# Patient Record
Sex: MALE | Race: WHITE | Employment: UNEMPLOYED | URBAN - METROPOLITAN AREA
[De-identification: names, ages, dates, MRNs, and addresses within clinical notes are randomized per-mention and may not be internally consistent; named-entity substitution may affect disease eponyms.]

---

## 2022-06-14 ENCOUNTER — HOSPITAL ENCOUNTER (INPATIENT)
Age: 22
LOS: 3 days | Discharge: HOME | DRG: 885 | End: 2022-06-17
Attending: EMERGENCY MEDICINE | Admitting: PSYCHIATRY & NEUROLOGY
Payer: COMMERCIAL

## 2022-06-14 DIAGNOSIS — F32.A DEPRESSION WITH SUICIDAL IDEATION: Primary | ICD-10-CM

## 2022-06-14 DIAGNOSIS — R45.851 DEPRESSION WITH SUICIDAL IDEATION: Primary | ICD-10-CM

## 2022-06-14 LAB
ABSOLUTE EOS #: 0 K/UL (ref 0–0.4)
ABSOLUTE LYMPH #: 1.4 K/UL (ref 1–4.8)
ABSOLUTE MONO #: 0.6 K/UL (ref 0.1–1.3)
ACETAMINOPHEN LEVEL: <5 UG/ML (ref 10–30)
ALBUMIN SERPL-MCNC: 4.7 G/DL (ref 3.5–5.2)
ALP BLD-CCNC: 86 U/L (ref 40–129)
ALT SERPL-CCNC: 11 U/L (ref 5–41)
ANION GAP SERPL CALCULATED.3IONS-SCNC: 9 MMOL/L (ref 9–17)
AST SERPL-CCNC: 12 U/L
BASOPHILS # BLD: 0 % (ref 0–2)
BASOPHILS ABSOLUTE: 0 K/UL (ref 0–0.2)
BILIRUB SERPL-MCNC: 0.29 MG/DL (ref 0.3–1.2)
BUN BLDV-MCNC: 8 MG/DL (ref 6–20)
CALCIUM SERPL-MCNC: 9.3 MG/DL (ref 8.6–10.4)
CHLORIDE BLD-SCNC: 106 MMOL/L (ref 98–107)
CO2: 26 MMOL/L (ref 20–31)
CREAT SERPL-MCNC: 0.85 MG/DL (ref 0.7–1.2)
EOSINOPHILS RELATIVE PERCENT: 0 % (ref 0–4)
ETHANOL PERCENT: <0.01 %
ETHANOL: <10 MG/DL
GFR AFRICAN AMERICAN: >60 ML/MIN
GFR NON-AFRICAN AMERICAN: >60 ML/MIN
GFR SERPL CREATININE-BSD FRML MDRD: ABNORMAL ML/MIN/{1.73_M2}
GLUCOSE BLD-MCNC: 94 MG/DL (ref 70–99)
HCT VFR BLD CALC: 42.8 % (ref 41–53)
HEMOGLOBIN: 14.3 G/DL (ref 13.5–17.5)
LYMPHOCYTES # BLD: 13 % (ref 25–45)
MCH RBC QN AUTO: 28.3 PG (ref 26–34)
MCHC RBC AUTO-ENTMCNC: 33.4 G/DL (ref 31–37)
MCV RBC AUTO: 84.6 FL (ref 80–100)
MONOCYTES # BLD: 6 % (ref 2–8)
PDW BLD-RTO: 14 % (ref 11.5–14.9)
PLATELET # BLD: 250 K/UL (ref 150–450)
PMV BLD AUTO: 9.3 FL (ref 6–12)
POTASSIUM SERPL-SCNC: 3.9 MMOL/L (ref 3.7–5.3)
RBC # BLD: 5.06 M/UL (ref 4.5–5.9)
SALICYLATE LEVEL: <1 MG/DL (ref 3–10)
SEG NEUTROPHILS: 81 % (ref 34–64)
SEGMENTED NEUTROPHILS ABSOLUTE COUNT: 8.7 K/UL (ref 1.3–9.1)
SODIUM BLD-SCNC: 141 MMOL/L (ref 135–144)
TOTAL PROTEIN: 7.1 G/DL (ref 6.4–8.3)
WBC # BLD: 10.8 K/UL (ref 4.5–13.5)

## 2022-06-14 PROCEDURE — 80053 COMPREHEN METABOLIC PANEL: CPT

## 2022-06-14 PROCEDURE — 80143 DRUG ASSAY ACETAMINOPHEN: CPT

## 2022-06-14 PROCEDURE — 1240000000 HC EMOTIONAL WELLNESS R&B

## 2022-06-14 PROCEDURE — 36415 COLL VENOUS BLD VENIPUNCTURE: CPT

## 2022-06-14 PROCEDURE — G0480 DRUG TEST DEF 1-7 CLASSES: HCPCS

## 2022-06-14 PROCEDURE — 80179 DRUG ASSAY SALICYLATE: CPT

## 2022-06-14 PROCEDURE — 99285 EMERGENCY DEPT VISIT HI MDM: CPT

## 2022-06-14 PROCEDURE — 85025 COMPLETE CBC W/AUTO DIFF WBC: CPT

## 2022-06-14 RX ORDER — TRAZODONE HYDROCHLORIDE 50 MG/1
50 TABLET ORAL NIGHTLY PRN
Status: DISCONTINUED | OUTPATIENT
Start: 2022-06-15 | End: 2022-06-17 | Stop reason: HOSPADM

## 2022-06-14 RX ORDER — HYDROXYZINE 50 MG/1
50 TABLET, FILM COATED ORAL 3 TIMES DAILY PRN
Status: DISCONTINUED | OUTPATIENT
Start: 2022-06-14 | End: 2022-06-17 | Stop reason: HOSPADM

## 2022-06-14 RX ORDER — POLYETHYLENE GLYCOL 3350 17 G/17G
17 POWDER, FOR SOLUTION ORAL DAILY PRN
Status: DISCONTINUED | OUTPATIENT
Start: 2022-06-14 | End: 2022-06-17 | Stop reason: HOSPADM

## 2022-06-14 RX ORDER — ACETAMINOPHEN 325 MG/1
650 TABLET ORAL EVERY 4 HOURS PRN
Status: DISCONTINUED | OUTPATIENT
Start: 2022-06-14 | End: 2022-06-17 | Stop reason: HOSPADM

## 2022-06-14 RX ORDER — IBUPROFEN 400 MG/1
400 TABLET ORAL EVERY 6 HOURS PRN
Status: DISCONTINUED | OUTPATIENT
Start: 2022-06-14 | End: 2022-06-17 | Stop reason: HOSPADM

## 2022-06-14 RX ORDER — MAGNESIUM HYDROXIDE/ALUMINUM HYDROXICE/SIMETHICONE 120; 1200; 1200 MG/30ML; MG/30ML; MG/30ML
30 SUSPENSION ORAL EVERY 6 HOURS PRN
Status: DISCONTINUED | OUTPATIENT
Start: 2022-06-14 | End: 2022-06-17 | Stop reason: HOSPADM

## 2022-06-14 ASSESSMENT — ENCOUNTER SYMPTOMS
ABDOMINAL PAIN: 0
COUGH: 0

## 2022-06-14 ASSESSMENT — PAIN - FUNCTIONAL ASSESSMENT: PAIN_FUNCTIONAL_ASSESSMENT: NONE - DENIES PAIN

## 2022-06-14 ASSESSMENT — PATIENT HEALTH QUESTIONNAIRE - PHQ9
SUM OF ALL RESPONSES TO PHQ QUESTIONS 1-9: 18
SUM OF ALL RESPONSES TO PHQ QUESTIONS 1-9: 18

## 2022-06-14 ASSESSMENT — LIFESTYLE VARIABLES
HOW MANY STANDARD DRINKS CONTAINING ALCOHOL DO YOU HAVE ON A TYPICAL DAY: 1 OR 2
HOW OFTEN DO YOU HAVE A DRINK CONTAINING ALCOHOL: NEVER
HOW OFTEN DO YOU HAVE A DRINK CONTAINING ALCOHOL: NEVER

## 2022-06-14 ASSESSMENT — SLEEP AND FATIGUE QUESTIONNAIRES
DO YOU USE A SLEEP AID: NO
AVERAGE NUMBER OF SLEEP HOURS: 6
DO YOU HAVE DIFFICULTY SLEEPING: NO

## 2022-06-14 NOTE — ED NOTES
This writer consulted with Dr Bassam Giraldo who recommended inpatient hospitalization for safety and stabilization. Patient signed application for voluntary admission to [facility].

## 2022-06-14 NOTE — ED PROVIDER NOTES
[address]  EMERGENCY DEPARTMENT ENCOUNTER      Pt Name: Santi Chacon  MRN: [id number]  Tamaragfteja 2000  Date of evaluation: 6/14/22      CHIEF COMPLAINT       Chief Complaint   Patient presents with    Psychiatric Evaluation     HISTORY OF PRESENT ILLNESS   HPI 25 y.o. male presents with c/o suicidal thoughts. The patient was brought to the emergency department by [employer]. Pt reports that he has been having suicidal thoughts for the last month  The patient has been thinking of shooting himself. The patient denies a h/o of previous suicide attempt. The patient does not know what has provoked his symptoms. He states that he has a lot of problems dealing with stress and anxiety. Reports that he was fired from his job recently because of his anger / behavior problems. He also reports that he is living at home with his parents. The patient denies a h/o of previous psychiatric admission. The patient denies drug use, denies attempts at self harm to this point. The patient denies medical complaints at this time. REVIEW OF SYSTEMS     Review of Systems   Constitutional: Negative for fever. HENT: Negative for congestion. Eyes: Negative for visual disturbance. Respiratory: Negative for cough. Cardiovascular: Negative for chest pain. Gastrointestinal: Negative for abdominal pain. Genitourinary: Negative for difficulty urinating. Musculoskeletal: Negative for arthralgias. Skin: Positive for rash (reports that he has a cat that has some superficial cuts to his arms. ). Neurological: Negative for headaches. Psychiatric/Behavioral: Positive for decreased concentration, dysphoric mood and suicidal ideas. PAST MEDICAL HISTORY   None    SURGICAL HISTORY     none    CURRENT MEDICATIONS       Nonw    ALLERGIES     has no allergies on file. FAMILY HISTORY     none    SOCIAL HISTORY      reports that he has never smoked.  He has never used smokeless tobacco. He reports previous alcohol use. He reports that he does not use drugs. PHYSICAL EXAM     INITIAL VITALS: /82   Pulse 95   Temp 97.9 °F (36.6 °C) (Tympanic)   Resp 15   Ht 5' 10\" (1.778 m)   Wt 200 lb (90.7 kg)   SpO2 99%   BMI 28.70 kg/m²   Gen: NAD  Head: Normocephalic, atraumatic  Eye: Pupils equal round reactive to light, no conjunctivitis  Heart: Regular rate and rhythm no murmurs  Lungs: Clear to auscultation bilaterally, no respiratory distress  Chest wall: No crepitus, no tenderness palpation  Abdomen: Soft, nontender, nondistended, with no peritoneal signs  Skin: No diaphoresis. no lacerations. Neurologic: Patient is alert and oriented x3, motor and sensation is intact in all 4 extremities, speech is fluent  Extremities: Full range of motion, no cyanosis, no edema, no signs of trauma, no tenderness to palpation    MEDICAL DECISION MAKING:     MDM  25 y.o. male with depression, suicidal thoughts,  suicidal plan. no previous suicide attempt. The patient does not appear intoxicated. The patient is showing no signs of any acute active toxidrome. The patient denies any overdose attempt or  medical complaints at this time. We'll screen for any acetaminophen or salicylate ingestion, we'll check baseline renal function, liver function, a drug screen, cbc and reassess. Emergency Department course:    Laboratory studies reviewed and unremarkable. Patient is medically clear for psychiatric evaluation.  will discuss the case with the psychiatry service, anticipate inpatient psychiatric admission. DIAGNOSTIC RESULTS     LABS: All lab results were reviewed by myself, and all abnormals are listed below.   Labs Reviewed   CBC WITH AUTO DIFFERENTIAL - Abnormal; Notable for the following components:       Result Value    Seg Neutrophils 81 (*)     Lymphocytes 13 (*)     All other components within normal limits   COMPREHENSIVE METABOLIC PANEL - Abnormal; Notable for the following components: Total Bilirubin 0.29 (*)     All other components within normal limits   ACETAMINOPHEN LEVEL - Abnormal; Notable for the following components:    Acetaminophen Level <5 (*)     All other components within normal limits   SALICYLATE LEVEL - Abnormal; Notable for the following components:    Salicylate Lvl <1 (*)     All other components within normal limits   ETHANOL   URINE DRUG SCREEN       EMERGENCY DEPARTMENT COURSE:   Vitals:    Vitals:    06/14/22 1706   BP: 138/82   Pulse: 95   Resp: 15   Temp: 97.9 °F (36.6 °C)   TempSrc: Tympanic   SpO2: 99%   Weight: 200 lb (90.7 kg)   Height: 5' 10\" (1.778 m)       The patient was given the following medications while in the emergency department:  No orders of the defined types were placed in this encounter. -------------------------  CRITICAL CARE:   CONSULTS: None  PROCEDURES: Procedures     FINAL IMPRESSION      1. Depression with suicidal ideation          DISPOSITION/PLAN   DISPOSITION Decision To Admit 06/14/2022 05:43:35 PM      PATIENT REFERRED TO:  No follow-up provider specified.     DISCHARGE MEDICATIONS:  New Prescriptions    No medications on file         Edgar Arreguin MD  Attending Emergency Physician                     Edgar Arreguin MD  06/14/22 [id number]

## 2022-06-14 NOTE — ED TRIAGE NOTES
Pt here voluntarily by [employer] for suicidal threats made to parents. Pt states that he has suicidal threats often and thinks about shooting himself with a gun. Pt was in an argument with parents when the threats were made.

## 2022-06-14 NOTE — ED NOTES
Provisional Diagnosis:   Depression with suicidal ideations     Psychosocial and Contextual Factors:    Patient had an argument with his family today. (homelessness, barriers to treatment)    C-SSRS Summary:      Patient: X  Family:   Agency:         Present Suicidal Behavior:  X    Verbal:  Patient reports suicidal ideations with thoughts of shooting himself with a gun. Attempt:     Past Suicidal Behavior:  Patient denies. Verbal:     Attempt:         Substance Abuse: Patient denies. Self-Injurious/Self-Mutilation: Patient denies. Violence Current or Past: Non noted. Patient cooperative in the ED. Trauma Identified:  Patient denies. Protective Factors:    Patient has housing. Patient has insurance. Risk Factors:    Patient is not employed. Patient is not linked. Patient has poor coping skills. Clinical Summary:    Patient is a 25year old male that is brought to the ED today via police on a voluntary status for suicidal ideations. Patient reports he got into a fight with his parents today at which time he disclosed suicidal ideations. Patient states \" I told my mom that I want to kill myself all the time. \" Patient states this is a true statement. Patient reports his mom got worried and called the police for help. Patient reports suicidal ideations with thoughts to shoot himself with a gun. Patient denies H/I at this time. Patient denies auditory or visual hallucinations. Patient reports he has had \"mental problems\" for a long time. Patient reports feeling depressed and having difficulty managing his anger. Patient reports he has never been seen or treated for his mental health. Patient reports he did go to an appointment today with a PCP to get established but left feeling like nothing was accomplished. Patient lives with his parents. Patient has not been able to maintain employment for over 6 months. Patient denies drug or alcohol use.  Patient denies any legal issues. Patient reports good sleep and appetite. Patient is voluntary. Level of Care Disposition:    Report given to oncoming .

## 2022-06-14 NOTE — PROGRESS NOTES
Medication History completed    New medications:  None    Medications discontinued:  None    Changes to dosing:  None    Stated allergies:  NKDA    Other pertinent information:  Patient states he takes no medications. Patient denies any tobacco, alcohol, cannabis, & street drugs.     Keke Jovel PharmD Candidate [id number]

## 2022-06-15 PROBLEM — F32.2 MAJOR DEPRESSIVE DISORDER, SINGLE EPISODE, SEVERE WITHOUT PSYCHOSIS (HCC): Status: ACTIVE | Noted: 2022-06-15

## 2022-06-15 PROCEDURE — APPSS60 APP SPLIT SHARED TIME 46-60 MINUTES: Performed by: NURSE PRACTITIONER

## 2022-06-15 PROCEDURE — 99223 1ST HOSP IP/OBS HIGH 75: CPT | Performed by: INTERNAL MEDICINE

## 2022-06-15 PROCEDURE — 1240000000 HC EMOTIONAL WELLNESS R&B

## 2022-06-15 PROCEDURE — 6370000000 HC RX 637 (ALT 250 FOR IP): Performed by: PSYCHIATRY & NEUROLOGY

## 2022-06-15 PROCEDURE — 90792 PSYCH DIAG EVAL W/MED SRVCS: CPT | Performed by: PSYCHIATRY & NEUROLOGY

## 2022-06-15 RX ORDER — SERTRALINE HYDROCHLORIDE 25 MG/1
25 TABLET, FILM COATED ORAL DAILY
Status: DISCONTINUED | OUTPATIENT
Start: 2022-06-15 | End: 2022-06-17 | Stop reason: HOSPADM

## 2022-06-15 RX ADMIN — SERTRALINE HYDROCHLORIDE 25 MG: 25 TABLET ORAL at 17:43

## 2022-06-15 ASSESSMENT — LIFESTYLE VARIABLES
HOW OFTEN DO YOU HAVE A DRINK CONTAINING ALCOHOL: NEVER
HOW MANY STANDARD DRINKS CONTAINING ALCOHOL DO YOU HAVE ON A TYPICAL DAY: 1 OR 2

## 2022-06-15 NOTE — PLAN OF CARE
patient within 72 hours    PLAN/TREATMENT RECOMMENDATIONS:     continue group therapy , medications compliance, goal setting, individualized assessments and care, continue to monitor pt on unit      SHORT-TERM GOALS:   Time frame for Short-Term Goals: 5-7 days    LONG-TERM GOALS:  Time frame for Long-Term Goals: 6 months  Members Present in Team Meeting: See Signature Sheet    Charley Franco

## 2022-06-15 NOTE — PROGRESS NOTES
Behavioral Services  Medicare Certification Upon Admission    I certify that this patient's inpatient psychiatric hospital admission is medically necessary for:    [x] (1) Treatment which could reasonably be expected to improve this patient's condition,       [x] (2) Or for diagnostic study;     AND     [x](2) The inpatient psychiatric services are provided while the individual is under the care of a physician and are included in the individualized plan of care.     Estimated length of stay/service 3-5 days    Plan for post-hospital care hc    Electronically signed by Tim Jonas MD on 6/15/2022 at 12:47 PM

## 2022-06-15 NOTE — GROUP NOTE
Group Therapy Note    Date: 6/15/2022    Group Start Time: 0900  Group End Time: 7675  Group Topic: Community Meeting    NORMA Mei        Group Therapy Note    Attendees: 6/14         Patient's Goal:  Talk with doctor about going home, not to be reactive    Notes:  Pleasant, cooperative    Status After Intervention:  Unchanged    Participation Level: Active Listener and Interactive    Participation Quality: Appropriate and Attentive      Speech:  normal      Thought Process/Content: Logical      Affective Functioning: Congruent      Mood: stable      Level of consciousness:  Alert and Attentive      Response to Learning: Able to verbalize current knowledge/experience and Progressing to goal      Endings: None Reported    Modes of Intervention: Education, Support and Socialization      Discipline Responsible: Behavorial Health Tech      Signature:   Tanner Rincon

## 2022-06-15 NOTE — PROGRESS NOTES
[id number] [facility]  Admission Note     Admission Type:   Admission Type: Voluntary    Reason for admission:  Reason for Admission: suicidal ideations to shoot self    PATIENT STRENGTHS:       Patient Strengths and Limitations:       Addictive Behavior:   Addictive Behavior  In the Past 3 Months, Have You Felt or Has Someone Told You That You Have a Problem With  : None    Medical Problems:   History reviewed. No pertinent past medical history. Status EXAM:  Mental Status and Behavioral Exam  Normal: No  Level of Assistance: Independent/Self  Facial Expression: Worried,Sad  Affect: Appropriate  Level of Consciousness: Alert  Frequency of Checks: 4 times per hour, close  Mood:Normal: No  Mood: Anxious,Depressed,Irritable  Motor Activity:Normal: Yes  Eye Contact: Good  Observed Behavior: Cooperative,Preoccupied  Sexual Misconduct History: Past - yes  Involved In Any Sexual Misconduct With Others? : No  History of Sexually Inappropriate Behavior When Previously Hospitalized?: No  Uncontrollable/Compulsive Masturbation?: No  Difficulty Controlling Sexual Impulses?: No  Preception: Others (comment)  Attention:Normal: Yes  Thought Processes: Other (comment)  Thought Content:Normal: Yes  Depression Symptoms: Increased irritability  Anxiety Symptoms: Generalized  Sully Symptoms: No problems reported or observed. Hallucinations: None  Delusions: No  Memory:Normal: Yes  Insight and Judgment: No  Insight and Judgment: Poor judgment,Poor insight    Tobacco Screening:  Practical Counseling, on admission, jorge X, if applicable and completed (first 3 are required if patient doesn't refuse):             (x )  Recognizing danger situations (included triggers and roadblocks)                    (x)  Coping skills (new ways to manage stress, exercise, relaxation techniques, changing routine, distraction)                                                           (x )  Basic information about quitting (benefits of quitting, techniques in how to quit, available resources  ( ) Referral for counseling faxed to August                                           ( ) Patient refused counseling  ( ) Patient has not smoked in the last 30 days    Metabolic Screening:    No results found for: LABA1C    No results found for: CHOL  No results found for: TRIG  No results found for: HDL  No components found for: LDLCAL  No results found for: LABVLDL      Body mass index is 28.7 kg/m². BP Readings from Last 2 Encounters:   06/14/22 124/67           Pt admitted with followings belongings:  Dental Appliances: None  Vision - Corrective Lenses: Eyeglasses  Hearing Aid: None  Jewelry: None  Body Piercings Removed: N/A  Clothing: Belt,Pants,Shirt,Socks,Undergarments,Footwear  Other Valuables: Money,Wallet ($0.12)       Patient oriented to surroundings and program expectations and copy of patient rights given. Received admission packet:  yes. Consents reviewed, signed yes. Patient verbalize understanding:  yes. Patient education on precautions: yes                   Suicidal with a plan to shoot himself. Has not been able to hold a job and has been fighting with his parents, reports he has trouble controlling his anger. First psych admission and not taking any medication. He lives with his parents and denies any drug or alcohol use. He is anxious but remains pleasant and cooperative on admission. He changes into hospital attire and accepts food and drink.       Radha Cruz RN

## 2022-06-15 NOTE — CARE COORDINATION
Patient signed 72 hour notice - physician and charge nurse were notified as well as attending nurse.

## 2022-06-15 NOTE — PLAN OF CARE
Problem: Depression/Self Harm  Goal: Effect of psychiatric condition will be minimized and patient will be protected from self harm  Description: INTERVENTIONS:  1. Assess impact of patient's symptoms on level of functioning, self care needs and offer support as indicated  2. Assess patient/family knowledge of depression, impact on illness and need for teaching  3. Provide emotional support, presence and reassurance  4. Assess for possible suicidal thoughts or ideation. If patient expresses suicidal thoughts or statements do not leave alone, initiate Suicide Precautions, move to a room close to the nursing station and obtain sitter  5. Initiate consults as appropriate with Mental Health Professional, Spiritual Care, Psychosocial CNS, and consider a recommendation to the LIP for a Psychiatric Consultation  Outcome: Progressing  Pt. Cooperative with vitals. Ate breakfast. Pt relates he is feeling anxious being here and wants to go home. Pt states he knows he scared his mother and he feels bad about that. Denies any suicidal thoughts. Denies hallucinations. Interacting well with select peers. Active in milieu. No current scheduled medications. Pt. Remains safe on the unit. Q 15 minute checks for safety maintained.

## 2022-06-15 NOTE — H&P
Department of Psychiatry  Attending Physician Psychiatric Assessment     Reason for Admission to Psychiatric Unit:  Threat to self requiring 24 hour professional observation  A mental disorder causing major disability in social, interpersonal, occupational, and/or educational functioning that is leading to dangerous or life-threatening functioning, and that can only be addressed in an acute inpatient setting   Concerns about patient's safety in the community    CHIEF COMPLAINT: Depression with suicidal ideation    History obtained from: Patient, electronic medical record          HISTORY OF PRESENT ILLNESS:    Karina Segundo is a 25 y.o. male who has no significant past medical history. Patient presented to the [facility] ED accompanied by [facility] police after his mother called [phone]. According to ED documentation: Patient reports he got into a fight with his parents today at which time he disclosed suicidal ideations. Patient states \" I told my mom that I want to kill myself all the time. \" Patient states this is a true statement. Patient reports his mom got worried and called the police for help. Rashida Mason is interviewed today in private, he endorses depression and reports that he has had difficulty controlling his emotions the past few years. He reports that he has had increased stress and lost a lot of friends during the pandemic. He reports that this past year he has been more aware of his inability to control his anger and frustration. He reports that he has lost 2 jobs related to emotional instability and lashing out at his employers. He reports that he has never become physical however states \"it is obvious when I get angry and I cannot control it\".   Rashida Mason endorses depression, he reports that it interferes with his interest in enjoyable activities, he endorses feelings of guilt and worthlessness, he reports poor focus and concentration and has periods of time where he feels hopeless that his life will get better. He reports that on occasion he feels psychomotor slowing. He acknowledges that he did threaten to shoot himself in front of his parents when he was arguing with them about his recent visit with a primary care physician. He states that making the statement that he would shoot himself seemed \"the easiest and quickest\" thing to do. He denies any current or historical symptoms of ousmane, psychosis, OCD or PTSD. He reports having some anxiety however feels that when he gets worked up he works himself into a panic. He endorses symptoms of hyperventilating, crying and having difficulty \"seeing the future\". He reports that this is what occurred yesterday when he was fighting with his parents. He reports that their argument was heated and that he made \"irrational statements\". He reports that overall he does not actually want to die however gets overwhelmed and feels that it is the easiest way to overcome his problems. He is able to identify protective factors such as his family, friends and schooling that make him feel there is some hope that things could get better. In addition to symptoms of anxiety and panic we also explored personality traits. He does endorse fear of abandonment and rejection, a history of unstable relationships and intense anger outbursts however denies any history of self damaging behavior or chronic emptiness. Daniela Pruitt denies any history of tobacco, alcohol or illicit drug use. He reports that he scheduled an appointment with his primary care physician to discuss the issues that he has had related to his mood and he states \"the doctor did not listen to anything that I had to say, I do not even think he read the questionnaire I filled out\". He reports that he has never been on any psychotropic medication. This is his first hospitalization and he denies any previous actual suicide attempts.   He is willing to trial antidepressant medication and reports that he does believe he would benefit from therapy to work on coping skills. His plan is to return home with his parents however he states he may take some time and move in with a friend in [place] to get away for a little while. He just finished his first semester in school at [employer] and reports that that is going well and he \"does not want to mess it up\". We did discuss the importance of stability in symptoms and he remains agreeable to contract for safety on the inpatient unit. He verbalizes understanding that with the medication we will make sure that he is tolerating it well before discharging home and following up outpatient. History of head trauma: [] Yes [x] No    History of seizures: [] Yes [x] No    History of violence or aggression: [x] Yes [] No, verbal only         PSYCHIATRIC HISTORY:  [] Yes [x] No    Currently follows with primary care physician  Denies lifetime suicide attempts  Denies psychiatric hospital admissions    Home Medication Compliance: [] Yes [] No, N/A    Past psychiatric medications includes: N/A    Adverse reactions from psychotropic medications: [] Yes [] No         Lifetime Psychiatric Review of Systems         Depression: Endorses     Anxiety: Endorses minimal     Panic Attacks: Endorses     Sully or Hypomania: Denies     Phobias: Denies     Obsessions and Compulsions: Denies     Body or Vocal Tics: Denies     Visual Hallucinations: Denies     Auditory Hallucinations: Denies     Delusions/Paranoia: Denies     PTSD: Denies    Past Medical History:    History reviewed. No pertinent past medical history. Past Surgical History:    History reviewed. No pertinent surgical history. Allergies:  Patient has no known allergies. Social History:     Born in: [place]  Family: Reports that he grew up in [place] and [place], he reports that they moved to [place] where he finished high school. At present he lives in [place] with his parents and his 51-year-old sister.   He reports that he has a fair relationship with his sister. He denies any abuse growing up as a child. Highest Level of Education: High school graduate, currently enrolled at [employer]  Occupation: Unemployed, abruptly quit his job from [employer] within the past year and was fired from [employer] for anger outbursts  Marital Status: Single  Children: None  Residence: Resides with parents  Stressors: Occupational stressors, emotional instability  Patient Assets/Supportive Factors: Willingness to link for help, stable housing, supportive family         DRUG USE HISTORY  Social History     Tobacco Use   Smoking Status Never Smoker   Smokeless Tobacco Never Used     Social History     Substance and Sexual Activity   Alcohol Use Not Currently     Social History     Substance and Sexual Activity   Drug Use Never       Denies any tobacco, alcohol or illicit drug use. EtOH negative  UDS pending         LEGAL HISTORY:   HISTORY OF INCARCERATION: [] Yes [x] No    Family History:   History reviewed. No pertinent family history. Psychiatric Family History  Patient endorses psychiatric family history. Reports that his sister has depression and takes antidepressants. He reports that his mother has anxiety and he believes that his father is depressed though not diagnosed. Suicides in family: [] Yes [x] No    Substance use in family: [x] Yes [] No -reports that his father is a \"drinker and smoker\"         PHYSICAL EXAM:  Vitals:  /64   Pulse 69   Temp 97.8 °F (36.6 °C) (Oral)   Resp 14   Ht 5' 10\" (1.778 m)   Wt 200 lb (90.7 kg)   SpO2 99%   BMI 28.70 kg/m²   Pain Level: 0/10    LABS:  Labs reviewed: [x] Yes          Review of Systems   Constitutional: Negative for chills and weight loss. HENT: Negative for ear pain and nosebleeds. Eyes: Negative for blurred vision and photophobia. Respiratory: Negative for cough, shortness of breath and wheezing. Cardiovascular: Negative for chest pain and palpitations. Gastrointestinal: Negative for abdominal pain, diarrhea and vomiting. Genitourinary: Negative for dysuria and urgency. Musculoskeletal: Negative for falls and joint pain. Skin: Negative for itching and rash. Neurological: Negative for tremors, seizures and weakness. Endo/Heme/Allergies: Does not bruise/bleed easily. Physical Exam:   Constitutional:  Appears well-developed and well-nourished, no acute distress. HENT:   Head: Normocephalic and atraumatic. Eyes: Conjunctivae are normal. Right eye exhibits no discharge. Left eye exhibits no discharge. No scleral icterus. Wearing corrective lenses. Neck: Normal range of motion. Neck supple. Pulmonary/Chest:  No respiratory distress or accessory muscle use, no wheezing. Cardiac: Regular rate and rhythm. Abdominal: Soft. Non-tender. Exhibits no distension. Musculoskeletal: Normal range of motion. Exhibits no edema. Neurological: cranial nerves II-XII grossly in tact, normal gait and station. Skin: Skin is warm and dry. Patient is not diaphoretic. No erythema. Mental Status Examination:    Level of consciousness: Awake and alert  Appearance:  Appropriate attire, seated in chair, fair grooming   Behavior/Motor: Approachable, engages with interviewer  Attitude toward examiner:  Cooperative, attentive, good eye contact  Speech: Rapid, loud volume, and anxious tone. Mood: Depressed and disappointed in himself  Affect:  Congruent, anxious, somewhat nervous  Thought processes:  Goal directed, linear and coherent  Thought content: Reports improvement in suicidal ideations, contracts for safety on the unit.                Denies homicidal ideations               Denies hallucinations              Denies delusions              Denies paranoia  Cognition:  Oriented to self, location, time, situation  Concentration: Clinically adequate  Memory: Intact  Insight &Judgment: Fair/poor         DSM-5 Diagnosis    Principal Problem: Major depressive disorder, single episode, severe without psychosis ([facility] [address]    Psychosocial and Contextual factors:  Financial   Occupational   Relationship   Legal   Living situation   Educational     History reviewed. No pertinent past medical history. TREATMENT CONSIDERATIONS    Continue inpatient psychiatric treatment. Home medications reviewed. Medications as discussed with attending:  Consider first-line SSRI, or with his history of difficulty with focus/attention he may benefit from bupropion  Monitor need and frequency of PRN medications. Attempt to develop insight. Follow-up daily while inpatient. Reviewed risks and benefits as well as potential side effects with patient. CONSULT:  [x] Yes [] No  Internal medicine for medical management/medical H&P      Risk Management: close watch per standard protocol      Psychotherapy: participation in milieu and group and individual sessions with Attending Physician,  and Physician Assistant/CNP      Estimated length of stay:  2-14 days      GENERAL PATIENT/FAMILY EDUCATION  Patient will understand basic signs and symptoms, patient will understand benefits/risks and potential side effects from proposed medications, and patient will understand their role in recovery. Family is active in patient's care. Patient assets that may be helpful during treatment include: Intent to participate and engage in treatment, sufficient fund of knowledge and intellect to understand and utilize treatments. Goals:    1) Remission of suicidal ideation. 2) Stabilization of symptoms prior to discharge. 3) Establish efficacy and tolerability of medications. Behavioral Services  Medicare Certification     Admission Day 1  I certify that this patient's inpatient psychiatric hospital admission is medically necessary for:    x (1) treatment which could reasonably be expected to improve this patient's condition, or    x (2) diagnostic study or its equivalent.      Time Spent: 61 minutes    Amadeo Colon is a 25 y.o. male being evaluated face to face    --DONNA An CNP on 6/15/2022 at 2:04 PM    An electronic signature was used to authenticate this note. Psychiatry Attending Attestation     I independently saw and evaluated the patient. I reviewed the Advance Practice Provider's documentation above. Any additional comments or changes to the Advance Practice Provider's documentation are stated below otherwise agree with assessment. Patient is a 78-year-old male with history of depression, never been treated before, admitted for worsening suicidal thoughts and a plan to consult by shooting himself with a gun. Reports that he has been feeling increasingly depressed for last several weeks now. Endorses anhedonia. Identifies stressors as conflicts with his mother. Patient is extremely impulsive and discharge focused today. Discussed with him about trying Zoloft to help with his mood and he was very indifferent to the idea. Discussed with social work to give him a 3-day notice as patient is unable to come up with a good safety plan at this time. PLAN  We will start Zoloft and titrate to effect  Attempt to develop insight  Psycho-education conducted. Supportive Therapy conducted.   Probable discharge is 3 to 5 days  Follow-up TBD    Electronically signed by Shasta Horvath MD on 6/15/22 at 3:55 PM EDT

## 2022-06-15 NOTE — H&P
[facility] Internal Medicine  Michael Mendiola MD; Tono Harris MD; Luisa Quinteros MD; MD Saumya Ching MD; MD TIFFANY Calderon [facility] Internal Medicine   [facility]    HISTORY AND PHYSICAL EXAMINATION            Date:   6/15/2022  Patient name:  King Debra  Date of admission:  6/14/2022  5:16 PM  MRN:   [id number]  Account:  [de-identified]  YOB: 2000  PCP:    No primary care provider on file. Room:   [address] [place] [address]  Code Status:    Full Code    Chief Complaint:     Chief Complaint   Patient presents with    Psychiatric Evaluation     Depression  History Obtained From:     Patient medical record nursing staff    History of Present Illness:     King Debra is a 25 y.o. Non- / non  male who presents with Psychiatric Evaluation   and is admitted to the hospital for the management of Major depressive disorder, single episode, severe without psychosis ([facility] [address] 71-year-old gentleman who is overweight history of depression and suicidal ideation denies any medical history   No weight gain no weight loss no cold intolerance history of myopia wears glasses    Past Medical History:     History reviewed. No pertinent past medical history. Past Surgical History:     History reviewed. No pertinent surgical history. Medications Prior to Admission:     Prior to Admission medications    Not on File        Allergies:     Patient has no known allergies. Social History:     Tobacco:    reports that he has never smoked. He has never used smokeless tobacco.  Alcohol:      reports previous alcohol use. Drug Use:  reports no history of drug use. Family History:     History reviewed. No pertinent family history. Review of Systems:     Positive and Negative as described in HPI.     CONSTITUTIONAL:  negative for fevers, chills, sweats, fatigue, weight loss  HEENT:  negative for vision, hearing changes, runny nose, throat pain  RESPIRATORY:  negative for shortness of breath, cough, congestion, wheezing  CARDIOVASCULAR:  negative for chest pain, palpitations  GASTROINTESTINAL:  negative for nausea, vomiting, diarrhea, constipation, change in bowel habits, abdominal pain   GENITOURINARY:  negative for difficulty of urination, burning with urination, frequency   INTEGUMENT:  negative for rash, skin lesions, easy bruising   HEMATOLOGIC/LYMPHATIC:  negative for swelling/edema   ALLERGIC/IMMUNOLOGIC:  negative for urticaria , itching  ENDOCRINE:  negative increase in drinking, increase in urination, hot or cold intolerance  MUSCULOSKELETAL:  negative joint pains, muscle aches, swelling of joints  NEUROLOGICAL:  negative for headaches, dizziness, lightheadedness, numbness, pain, tingling extremities      Physical Exam:   /64   Pulse 69   Temp 97.8 °F (36.6 °C) (Oral)   Resp 14   Ht 5' 10\" (1.778 m)   Wt 200 lb (90.7 kg)   SpO2 99%   BMI 28.70 kg/m²   Temp (24hrs), Av.9 °F (36.6 °C), Min:97.8 °F (36.6 °C), Max:97.9 °F (36.6 °C)    No results for input(s): POCGLU in the last 72 hours.   No intake or output data in the 24 hours ending 06/15/22 1707    General Appearance: alert, well appearing, and in no acute distress  Mental status: oriented to person, place, and time  Head: normocephalic, atraumatic  Eye: no icterus, redness, pupils equal and reactive, extraocular eye movements intact, conjunctiva clear  Ear: normal external ear, no discharge, hearing intact  Nose: no drainage noted  Mouth: mucous membranes moist  Neck: supple, no carotid bruits, thyroid not palpable  Lungs: Bilateral equal air entry, clear to ausculation, no wheezing, rales or rhonchi, normal effort  Cardiovascular: normal rate, regular rhythm, no murmur, gallop, rub  Abdomen: Soft, nontender, nondistended, normal bowel sounds, no hepatomegaly or splenomegaly  Neurologic: There are no new focal motor or sensory deficits, normal muscle tone and bulk, no abnormal sensation, normal speech, cranial nerves II through XII grossly intact  Skin: No gross lesions, rashes, bruising or bleeding on exposed skin area  Extremities: peripheral pulses palpable, no pedal edema or calf pain with palpation      Investigations:      Laboratory Testing:  Recent Results (from the past 24 hour(s))   CBC with Auto Differential    Collection Time: 06/14/22  6:27 PM   Result Value Ref Range    WBC 10.8 4.5 - 13.5 k/uL    RBC 5.06 4.5 - 5.9 m/uL    Hemoglobin 14.3 13.5 - 17.5 g/dL    Hematocrit 42.8 41 - 53 %    MCV 84.6 80 - 100 fL    MCH 28.3 26 - 34 pg    MCHC 33.4 31 - 37 g/dL    RDW 14.0 11.5 - 14.9 %    Platelets 442 314 - 064 k/uL    MPV 9.3 6.0 - 12.0 fL    Seg Neutrophils 81 (H) 34 - 64 %    Lymphocytes 13 (L) 25 - 45 %    Monocytes 6 2 - 8 %    Eosinophils % 0 0 - 4 %    Basophils 0 0 - 2 %    Segs Absolute 8.70 1.3 - 9.1 k/uL    Absolute Lymph # 1.40 1.0 - 4.8 k/uL    Absolute Mono # 0.60 0.1 - 1.3 k/uL    Absolute Eos # 0.00 0.0 - 0.4 k/uL    Basophils Absolute 0.00 0.0 - 0.2 k/uL   Comprehensive Metabolic Panel    Collection Time: 06/14/22  6:27 PM   Result Value Ref Range    Glucose 94 70 - 99 mg/dL    BUN 8 6 - 20 mg/dL    CREATININE 0.85 0.70 - 1.20 mg/dL    Calcium 9.3 8.6 - 10.4 mg/dL    Sodium 141 135 - 144 mmol/L    Potassium 3.9 3.7 - 5.3 mmol/L    Chloride 106 98 - 107 mmol/L    CO2 26 20 - 31 mmol/L    Anion Gap 9 9 - 17 mmol/L    Alkaline Phosphatase 86 40 - 129 U/L    ALT 11 5 - 41 U/L    AST 12 <40 U/L    Total Bilirubin 0.29 (L) 0.3 - 1.2 mg/dL    Total Protein 7.1 6.4 - 8.3 g/dL    Albumin 4.7 3.5 - 5.2 g/dL    GFR Non-African American >60 >60 mL/min    GFR African American >60 >60 mL/min    GFR Comment         Ethanol    Collection Time: 06/14/22  6:27 PM   Result Value Ref Range    Ethanol <10 <10 mg/dL    Ethanol percent <0.010 %   Acetaminophen level    Collection Time: 06/14/22  6:27 PM   Result Value Ref Range    Acetaminophen Level <5 (L) 10 - 30 ug/mL   Salicylate    Collection Time: 06/14/22  6:27 PM   Result Value Ref Range    Salicylate Lvl <1 (L) 3 - 10 mg/dL       Imaging/Diagnostics:  No results found. Assessment :      Hospital Problems           Last Modified POA    * (Principal) Major depressive disorder, single episode, severe without psychosis ([facility] [address] 6/15/2022 Yes          Plan:     81-year-old gentleman with a history of myopia who wears glasses admitted for depression we will check thyroid TSH with reflex  Vitals are stable labs reviewed we will follow            Yaa Ch MD  6/15/2022  5:07 PM    Copy sent to Dr. Pierce Meneses primary care provider on file. Please note that this chart was generated using voice recognition Dragon dictation software. Although every effort was made to ensure the accuracy of this automated transcription, some errors in transcription may have occurred.

## 2022-06-15 NOTE — GROUP NOTE
Group Therapy Note    Date: 6/15/2022    Group Start Time: 1330  Group End Time: 2336  Group Topic: Music Therapy    [facility]    Sim Mon        Group Therapy Note    Pt did not attend music therapy group d/t resting in room despite staff invitation to attend. 1:1 talk time offered as alternative to group session, pt declined.

## 2022-06-15 NOTE — GROUP NOTE
Group Therapy Note    Date: 6/15/2022    Group Start Time: 1100  Group End Time: 6786  Group Topic: Psychoeducation    NORMA HERRERA    Sandee Schofield        Group Therapy Note    Attendees: 7/13         Patient's Goal:  Patients engaged in [address] [facility] or [address] conversation group. Shared their opinions on a variety of topics and engaged in conversations with peers and staff. Following activity, patients discussed qualities of conversations that could make them productive or unproductive, and discussed effective communication skills. Goals to practice and engage in discussion about effective communication; Increase self-expression; Increase socialization; Increase sense of community;    Notes:  Patient attended and participate din group having positive interactions with peers and staff throughout. Engage appropriately in conversations and was pleasant    Status After Intervention:  Improved    Participation Level:  Active Listener and Interactive    Participation Quality: Appropriate, Attentive and Sharing      Speech:  normal      Thought Process/Content: Logical  Linear      Affective Functioning: Congruent      Mood: euthymic      Level of consciousness:  Alert and Attentive      Response to Learning: Able to verbalize current knowledge/experience and Progressing to goal      Endings: None Reported    Modes of Intervention: Education, Socialization, Exploration, Activity and Reality-testing      Discipline Responsible: Psychoeducational Specialist      Signature:  Sandee Schofield

## 2022-06-15 NOTE — GROUP NOTE
Group Therapy Note    Date: 6/15/2022    Group Start Time: 1000  Group End Time: 3008  Group Topic: Psychotherapy    LISA Lama LSW        Group Therapy Note    Attendees: 5/14         Patient's Goal:  Increase interpersonal relationship skills    Notes:  Patient was an active participant in group discussion    Status After Intervention:  Unchanged    Participation Level:  Active Listener and Interactive    Participation Quality: Appropriate, Attentive and Sharing      Speech:  normal      Thought Process/Content: Logical  Linear      Affective Functioning: Congruent      Mood: euthymic      Level of consciousness:  Alert, Oriented x4 and Attentive      Response to Learning: Able to verbalize current knowledge/experience, Able to verbalize/acknowledge new learning, Able to retain information and Capable of insight      Endings: None Reported    Modes of Intervention: Support, Socialization and Exploration      Discipline Responsible: /Counselor      Signature:  LISA De La Cruz LSW

## 2022-06-15 NOTE — CARE COORDINATION
B[facility] Biopsychosocial Assessment    Current Level of Psychosocial Functioning     Independent X  Dependent    Minimal Assist     Comments:    Psychosocial High Risk Factors (check all that apply)    Unable to obtain meds   Chronic illness/pain    Substance abuse   Lack of Family Support   Financial stress   Isolation   Inadequate Community Resources  Suicide attempt(s)  Not taking medications   Victim of crime   Developmental Delay  Unable to manage personal needs    Age 72 or older   Homeless  No transportation   Readmission within 30 days  Unemployment  Traumatic Event    Comments:   Psychiatric Advanced Directives: n/a    Family to Involve in Treatment: not at this time    Sexual Orientation:  n/a    Patient Strengths: lives with family, no substance use, college student, insurance    Patient Barriers: difficulty managing anger, engages in self harm when upset, interpersonal relationship issues      Opiate Education Provided:  No- patient does not use opiates. CMHC/mental health history: this is patient's first time receiving mental health treatment of any kind    Plan of Care   medication management, group/individual therapies, family meetings, psycho -education, treatment team meetings to assist with stabilization    Initial Discharge Plan:  Discharge to home address where patient lives with family      Clinical Summary:    Alexander Mcclure is a 26 y/o male admitted to [facility] for symptoms of depression with suicidal ideations. Patient stated he was in an argument with his mom which was the worst argument they have had and he was engaging in self harm as a way of expressing his anger. He shares that he has a history of this behavior when upset and states it is an impulse for him. Patient shares that even though he argues with his mother more than his father that she is someone he considers supportive to him. He states that his father and him do not have a good relationship. Patient denies substance use.   He had no needs from social work at this time. Social work will engage patient in treatment and discharge planning as needed.

## 2022-06-15 NOTE — PROGRESS NOTES
RT ASSESSMENT TREATMENT GOALS    [x]Pt Goal:  Pt will identify 1-2 positive coping skills by time of discharge. []Pt Goal:  Pt will identify 1-2 positive aspects of self by time of discharge. []Pt Goal:  Pt will remain on task/topic for 15-30 minutes during group by time of discharge. []Pt Goal:  Pt will identify 1-2 aspects of relapse prevention plan by time of discharge. []Pt Goal:  Pt will join in conversation with peers 1-2 times per group by time of discharge. [x]Pt Goal:  Patient will verbalize plan to increase motivation outside of the hospital    [x]Pt Goal:  Patient will state 1-2 methods to manage impulses.

## 2022-06-16 LAB — TSH SERPL DL<=0.05 MIU/L-ACNC: 2.23 UIU/ML (ref 0.3–5)

## 2022-06-16 PROCEDURE — 6370000000 HC RX 637 (ALT 250 FOR IP): Performed by: PSYCHIATRY & NEUROLOGY

## 2022-06-16 PROCEDURE — APPSS30 APP SPLIT SHARED TIME 16-30 MINUTES: Performed by: NURSE PRACTITIONER

## 2022-06-16 PROCEDURE — 99232 SBSQ HOSP IP/OBS MODERATE 35: CPT | Performed by: INTERNAL MEDICINE

## 2022-06-16 PROCEDURE — 36415 COLL VENOUS BLD VENIPUNCTURE: CPT

## 2022-06-16 PROCEDURE — 1240000000 HC EMOTIONAL WELLNESS R&B

## 2022-06-16 PROCEDURE — 84443 ASSAY THYROID STIM HORMONE: CPT

## 2022-06-16 PROCEDURE — 99232 SBSQ HOSP IP/OBS MODERATE 35: CPT | Performed by: PSYCHIATRY & NEUROLOGY

## 2022-06-16 RX ADMIN — IBUPROFEN 400 MG: 400 TABLET ORAL at 21:02

## 2022-06-16 RX ADMIN — SERTRALINE HYDROCHLORIDE 25 MG: 25 TABLET ORAL at 08:52

## 2022-06-16 ASSESSMENT — PAIN SCALES - GENERAL
PAINLEVEL_OUTOF10: 3
PAINLEVEL_OUTOF10: 7

## 2022-06-16 ASSESSMENT — PAIN - FUNCTIONAL ASSESSMENT: PAIN_FUNCTIONAL_ASSESSMENT: ACTIVITIES ARE NOT PREVENTED

## 2022-06-16 ASSESSMENT — PAIN DESCRIPTION - LOCATION: LOCATION: HEAD

## 2022-06-16 ASSESSMENT — PAIN DESCRIPTION - DESCRIPTORS: DESCRIPTORS: ACHING

## 2022-06-16 NOTE — GROUP NOTE
Group Therapy Note    Date: 6/16/2022    Group Start Time: 1330  Group End Time: 1261  Group Topic: Recreational    NORMA Wang        Group Therapy Note    Attendees: 6/15       Patient's Goal:  Patients engaged in game group to increase socialization, increase sense of community, normalize the environment    Notes:  Patient attended and participated in group having positive interactions with peers and staff. Patient was appropriately engaged and pleasant throughout    Status After Intervention:  Improved    Participation Level:  Active Listener and Interactive    Participation Quality: Appropriate and Attentive      Speech:  normal      Thought Process/Content: Logical  Linear      Affective Functioning: Congruent      Mood: euthymic      Level of consciousness:  Alert and Attentive      Response to Learning: Able to verbalize current knowledge/experience and Progressing to goal      Endings: None Reported

## 2022-06-16 NOTE — GROUP NOTE
Group Therapy Note    Date: 6/15/2022    Group Start Time: 2030  Group End Time: 2105  Group Topic: Wrap-Up    NORMA Manning        Group Therapy Note    Attendees: 7         Patient's Goal:  Anger issues    Notes:  Open to going to a [address] upon D/C    Status After Intervention:  Improved    Participation Level:  Active Listener    Participation Quality: Appropriate      Speech:  normal      Thought Process/Content: Logical      Affective Functioning: Congruent      Mood: anxious      Level of consciousness:  Alert, Oriented x4 and Attentive      Response to Learning: Able to verbalize/acknowledge new learning      Endings: None Reported    Modes of Intervention: Problem-solving      Discipline Responsible: [place]      Signature:  Brie Prince

## 2022-06-16 NOTE — PLAN OF CARE
Problem: Depression/Self Harm  Goal: Effect of psychiatric condition will be minimized and patient will be protected from self harm  Description: INTERVENTIONS:  1. Assess impact of patient's symptoms on level of functioning, self care needs and offer support as indicated  2. Assess patient/family knowledge of depression, impact on illness and need for teaching  3. Provide emotional support, presence and reassurance  4. Assess for possible suicidal thoughts or ideation. If patient expresses suicidal thoughts or statements do not leave alone, initiate Suicide Precautions, move to a room close to the nursing station and obtain sitter  5. Initiate consults as appropriate with Mental Health Professional, Spiritual Care, Psychosocial CNS, and consider a recommendation to the LIP for a Psychiatric Consultation  Outcome: Progressing    Pt. Is medication compliant. Attends groups. Out in milieu. Pt remains focused on discharge. Cooperative with vitals and has appropriate self care. Denies hallucinations. Denies suicidal thoughts. Pt. Remains safe on the unit. Q 15 minute checks for safety maintained.

## 2022-06-16 NOTE — PLAN OF CARE
[id number] [facility]  Day 3 Interdisciplinary Treatment Plan NOTE    Review Date & Time: 6/16/2022   0933    Admission Type:   Admission Type: Voluntary    Reason for admission:  Reason for Admission: suicidal ideations to shoot self  Estimated Length of Stay: 5-7 days  Estimated Discharge Date Update: to be determined by physician    PATIENT STRENGTHS:  Patient Strengths    Patient Strengths and Limitations:Limitations: Difficulty problem solving/relies on others to help solve problems (Patient reported he does not have his license or car. )  Addictive Behavior:Addictive Behavior  In the Past 3 Months, Have You Felt or Has Someone Told You That You Have a Problem With  : None  Medical Problems:History reviewed. No pertinent past medical history. Risk:  Fall Risk   Ramu Scale Ramu Scale Score: 22  BVC    Change in scores no Changes to plan of Care no    Status EXAM:   Mental Status and Behavioral Exam  Normal: Yes  Level of Assistance: Independent/Self  Facial Expression: Brightened  Affect: Blunt  Level of Consciousness: Alert  Frequency of Checks: 4 times per hour, close  Mood:Normal: No  Mood: Anxious,Helpless  Motor Activity:Normal: Yes  Eye Contact: Good  Observed Behavior: Friendly,Cooperative  Sexual Misconduct History: Current - no  Involved In Any Sexual Misconduct With Others? : No  History of Sexually Inappropriate Behavior When Previously Hospitalized?: No  Uncontrollable/Compulsive Masturbation?: No  Difficulty Controlling Sexual Impulses?: No  Preception: [place] to person,[place] to time,[place] to place,[place] to situation  Attention:Normal: No  Attention: Distractible  Thought Processes: Circumstantial  Thought Content:Normal: No  Thought Content: Preoccupations  Depression Symptoms: Loss of interest,Feelings of helplessness  Anxiety Symptoms: Generalized  Sully Symptoms: No problems reported or observed.   Hallucinations: None  Delusions: No  Memory:Normal: No  Memory: Poor remote  Insight and Judgment: No  Insight and Judgment: Poor judgment,Poor insight    Daily Assessment Last Entry:   Daily Sleep (WDL): Within Defined Limits            Daily Nutrition (WDL): Within Defined Limits  Level of Assistance: Independent/Self    Patient Monitoring:  Frequency of Checks: 4 times per hour, close    Psychiatric Symptoms:   Depression Symptoms  Depression Symptoms: Loss of interest,Feelings of helplessness  Anxiety Symptoms  Anxiety Symptoms: Generalized  Sully Symptoms  Sully Symptoms: No problems reported or observed. Suicide Risk CSSR-S:  1) Within the past month, have you wished you were dead or wished you could go to sleep and not wake up? : Yes  2) Have you actually had any thoughts of killing yourself? : Yes  3) Have you been thinking about how you might kill yourself? : Yes  5) Have you started to work out or worked out the details of how to kill yourself?  Do you intend to carry out this plan? : Yes  6) Have you ever done anything, started to do anything, or prepared to do anything to end your life?: No  Change in Result NO Change in Plan of care NO      EDUCATION:   EDUCATION:   Learner Progress Toward Treatment Goals: Reviewed results and recommendations of this team, Reviewed group plan and strategies, Reviewed signs, symptoms and risk of self harm and violent behavior, Reviewed goals and plan of care    Method:small group, individual verbal education    Outcome:verbalized by patient, but needs reinforcement to obtain goals    PATIENT GOALS:  Short term: Medication stabalization  Long term: Improved self-control with behaviors, depressive symptoms, and suicidal thoughts;     PLAN/TREATMENT RECOMMENDATIONS UPDATE: continue with group therapies, increased socialization, continue planning for after discharge goals, continue with medication compliance    SHORT-TERM GOALS UPDATE:   Time frame for Short-Term Goals: 5-7 days    LONG-TERM GOALS UPDATE:   Time frame for Long-Term Goals: 6 months  Members Present in Team Meeting: See Signature Sheet    Tariq Burks

## 2022-06-16 NOTE — PROGRESS NOTES
Daily Progress Note  6/16/2022    Patient Name: Bri Curry: Depression with suicidal ideation with plan to shoot self         SUBJECTIVE:   Patient was seen for follow-up assessment today. Nursing staff report patient has been medication adherent and behaviorally controlled. He has not required any emergency medications since admission. He continues to report feelings of anxiety and nervousness. He was pleasant on approach and agreeable to conversation in privacy of unit interview room. Patient was asked to revisit the events which led to his hospitalization. He discussed living with his parents and fighting frequently mostly about \"my upbringing\" and his struggles with mental illness. Patient's thought process and speech are organized and linear, however he speaks very loudly, but is not hostile or angry. He talks candidly about his history of anger and easily and frequent thoughts of harming himself. He then states \"but I love my life, I could never kill myself, suicide is a permanent solution to a temporary problem\". He discussed how the past few years have been very stressful related to a move from [address] [facility] and leaving all of his friends behind. He reports he would like to get a job as he feels this will help things. He then shares that he will be staying with a close friend of his in the [facility] over the summer and his friend will be teaching him how to drive. He is forward thinking and looking forward to this plan. Patient was encouraged to connect with community mental health after discharge. He is agreeable to this suggestion. Patient reports that suicidal ideation has resolved. He is hopeful for discharge tomorrow. He denies auditory and visual hallucinations and makes no delusional or paranoid statements.     Compliant with scheduled medications: [x] Yes    [] No     Received emergency medications in past 24 hrs: [] Yes   [x] No    Appetite:  [x] Normal/Adequate/Unchanged  [] Increased  [] Decreased      Sleep:       [] Normal/Adequate/Unchanged  [x] Fair  [] Poor      Group Attendance on Unit:   [x] Yes  [] Selectively    [] No    Medication Side Effects: Denies         Mental Status Exam  Level of consciousness: Alert and awake   Appearance: Appropriate attire for setting, seated in chair, with fair  grooming and hygiene   Behavior/Motor: Approachable, somewhat restless  Attitude toward examiner: Cooperative, attentive, good eye contact   Speech: spontaneous, normal rate and loud   Mood: Anxious  Affect: Congruent  Thought processes: linear, coherent and rapid   Thought content: Denies homicidal ideation  Suicidal Ideation: Improved  Delusions: Not evident  Perceptual Disturbance: patient is not observed responding to internal stimuli  Cognition: Oriented to self, location, time, and situation  Memory: intact  Insight & Judgement: fair     Data   height is 5' 10\" (1.778 m) and weight is 200 lb (90.7 kg). His oral temperature is 98.9 °F (37.2 °C). His blood pressure is 124/67 and his pulse is 100. His respiration is 12 and oxygen saturation is 99%.    Labs:   Admission on 06/14/2022   Component Date Value Ref Range Status    WBC 06/14/2022 10.8  4.5 - 13.5 k/uL Final    RBC 06/14/2022 5.06  4.5 - 5.9 m/uL Final    Hemoglobin 06/14/2022 14.3  13.5 - 17.5 g/dL Final    Hematocrit 06/14/2022 42.8  41 - 53 % Final    MCV 06/14/2022 84.6  80 - 100 fL Final    MCH 06/14/2022 28.3  26 - 34 pg Final    MCHC 06/14/2022 33.4  31 - 37 g/dL Final    RDW 06/14/2022 14.0  11.5 - 14.9 % Final    Platelets 54/62/7999 250  150 - 450 k/uL Final    MPV 06/14/2022 9.3  6.0 - 12.0 fL Final    Seg Neutrophils 06/14/2022 81* 34 - 64 % Final    Lymphocytes 06/14/2022 13* 25 - 45 % Final    Monocytes 06/14/2022 6  2 - 8 % Final    Eosinophils % 06/14/2022 0  0 - 4 % Final    Basophils 06/14/2022 0  0 - 2 % Final    Segs Absolute 06/14/2022 8.70  1.3 - 9.1 k/uL Final  Absolute Lymph # 06/14/2022 1.40  1.0 - 4.8 k/uL Final    Absolute Mono # 06/14/2022 0.60  0.1 - 1.3 k/uL Final    Absolute Eos # 06/14/2022 0.00  0.0 - 0.4 k/uL Final    Basophils Absolute 06/14/2022 0.00  0.0 - 0.2 k/uL Final    Glucose 06/14/2022 94  70 - 99 mg/dL Final    BUN 06/14/2022 8  6 - 20 mg/dL Final    CREATININE 06/14/2022 0.85  0.70 - 1.20 mg/dL Final    Calcium 06/14/2022 9.3  8.6 - 10.4 mg/dL Final    Sodium 06/14/2022 141  135 - 144 mmol/L Final    Potassium 06/14/2022 3.9  3.7 - 5.3 mmol/L Final    Chloride 06/14/2022 106  98 - 107 mmol/L Final    CO2 06/14/2022 26  20 - 31 mmol/L Final    Anion Gap 06/14/2022 9  9 - 17 mmol/L Final    Alkaline Phosphatase 06/14/2022 86  40 - 129 U/L Final    ALT 06/14/2022 11  5 - 41 U/L Final    AST 06/14/2022 12  <40 U/L Final    Total Bilirubin 06/14/2022 0.29* 0.3 - 1.2 mg/dL Final    Total Protein 06/14/2022 7.1  6.4 - 8.3 g/dL Final    Albumin 06/14/2022 4.7  3.5 - 5.2 g/dL Final    GFR Non- 06/14/2022 >60  >60 mL/min Final    GFR  06/14/2022 >60  >60 mL/min Final    GFR Comment 06/14/2022        Final    Comment: Average GFR for 20-28 years old:   116 mL/min/1.73sq m  Chronic Kidney Disease:   <60 mL/min/1.73sq m  Kidney failure:   <15 mL/min/1.73sq m              eGFR calculated using average adult body mass. Additional eGFR calculator available at:        [email or web address]            Ethanol 06/14/2022 <10  <10 mg/dL Final    Ethanol percent 06/14/2022 <0.010  % Final    Acetaminophen Level 06/14/2022 <5* 10 - 30 ug/mL Final    Salicylate Lvl 25/65/2049 <1* 3 - 10 mg/dL Final    TSH 06/16/2022 2.23  0.30 - 5.00 uIU/mL Final         Reviewed patient's current plan of care and vital signs with nursing staff.     Labs reviewed: [x] Yes  Last EKG in EMR reviewed: [x] Yes    Medications  Current Facility-Administered Medications: sertraline (ZOLOFT) tablet 25 mg, 25 mg, Oral, Daily  acetaminophen (TYLENOL) tablet 650 mg, 650 mg, Oral, Q4H PRN  aluminum & magnesium hydroxide-simethicone (MAALOX) 200-200-20 MG/5ML suspension 30 mL, 30 mL, Oral, Q6H PRN  hydrOXYzine HCl (ATARAX) tablet 50 mg, 50 mg, Oral, TID PRN  ibuprofen (ADVIL;MOTRIN) tablet 400 mg, 400 mg, Oral, Q6H PRN  polyethylene glycol (GLYCOLAX) packet 17 g, 17 g, Oral, Daily PRN  traZODone (DESYREL) tablet 50 mg, 50 mg, Oral, Nightly PRN    ASSESSMENT  Major depressive disorder, single episode, severe without psychosis ([facility] [address]         HANDOFF  Patient symptoms are:  Improving  Medications as determined by attending physician  Encourage participation in groups and milieu. Probable discharge is to be determined by MD    Electronically signed by DONNA Gordillo CNP on 6/16/2022 at 3:33 PM    **This report has been created using voice recognition software. It may contain minor errors which are inherent in voice recognition technology. **                                         Psychiatry Attending Attestation     I independently saw and evaluated the patient. I reviewed the Advance Practice Provider's documentation above. Any additional comments or changes to the Advance Practice Provider's documentation are stated below otherwise agree with assessment. Patient feels better than before. Mood and affect are better. Patient reports fleeting suicidal thoughts with no intent or plan. Patient notes that these thoughts are occurring less frequently. Denies any homicidal thoughts, that was explored with the patient. Oriented to time place and person. Recent and remote memory is intact. Patient feels hopeful. Sleep and appetite is good. No side effect from medication reported. Side-effect of medication were discussed with the patient . Patient is responding to current treatment. Discharge soon, if patient continues to show improvement. Case discussed with the staff.        PLAN  Patient s symptoms are improving  We will continue to optimize Zoloft  Attempt to develop insight  Psycho-education conducted. Supportive Therapy conducted.   Probable discharge is tomorrow  Follow-up TBD    Electronically signed by Andrew Woodard MD on 6/16/22 at 4:03 PM EDT

## 2022-06-16 NOTE — PLAN OF CARE
Problem: Depression/Self Harm  Goal: Effect of psychiatric condition will be minimized and patient will be protected from self harm  Description: INTERVENTIONS:  1. Assess impact of patient's symptoms on level of functioning, self care needs and offer support as indicated  2. Assess patient/family knowledge of depression, impact on illness and need for teaching  3. Provide emotional support, presence and reassurance  4. Assess for possible suicidal thoughts or ideation. If patient expresses suicidal thoughts or statements do not leave alone, initiate Suicide Precautions, move to a room close to the nursing station and obtain sitter  5. Initiate consults as appropriate with Mental Health Professional, Spiritual Care, Psychosocial CNS, and consider a recommendation to the LIP for a Psychiatric Consultation  6/15/2022 2243 by Blossom Urbina RN  Outcome: Progressing  Patient has made no attempt to harm self at this time. Problem: Depression/Self Harm  Goal: LTG-Able to verbalize and/or display a decrease in depressive symptoms  Outcome: Progressing  Safety plan reviewed with patient, agrees to approach staff when feeling upset. 15 minute and random checks maintained for safety. No violent or escalating behaviors noted during this shift. Patient is currently calm, controlled and medication-compliant. Patient is social with peers, spending most of shift in dayroom and participated in evening group. Problem: Depression/Self Harm  Goal: STG-Able to verbalize suicidal ideations  Outcome: Progressing  Patient denies suicidal ideation, homicidal ideation and hallucinations at this time.

## 2022-06-16 NOTE — GROUP NOTE
Group Therapy Note    Date: 6/16/2022    Group Start Time: 1000  Group End Time: 1030  Group Topic: Psychotherapy    LISA Garza LSW        Group Therapy Note    Attendees: 5/16         Patient's Goal:  Increase interpersonal relationship skills    Notes:  Patient was an active participant in group discussion    Status After Intervention:  Unchanged    Participation Level:  Active Listener and Interactive    Participation Quality: Appropriate, Attentive and Sharing      Speech:  normal      Thought Process/Content: Logical  Linear      Affective Functioning: Congruent      Mood: euthymic      Level of consciousness:  Alert, Oriented x4 and Attentive      Response to Learning: Able to verbalize current knowledge/experience, Able to verbalize/acknowledge new learning, Able to retain information and Capable of insight      Endings: None Reported    Modes of Intervention: Support, Socialization and Exploration      Discipline Responsible: /Counselor      Signature:  LISA Smyth LSW

## 2022-06-16 NOTE — PROGRESS NOTES
[facility] Internal Medicine  Kat Oconnor MD; Sherri Boone MD; Carie Bass MD; MD Nadeen Landin MD; MD TIFFANY [facility] Internal Medicine   [facility]    HISTORY AND PHYSICAL EXAMINATION            Date:   6/16/2022  Patient name:  Keke Henson  Date of admission:  6/14/2022  5:16 PM  MRN:   [id number]  Account:  [de-identified]  YOB: 2000  PCP:    No primary care provider on file. Room:   [address] [place] [address]  Code Status:    Full Code    Chief Complaint:     Chief Complaint   Patient presents with    Psychiatric Evaluation     Depression  History Obtained From:     Patient medical record nursing staff    History of Present Illness:     Keke Henson is a 25 y.o. Non- / non  male who presents with Psychiatric Evaluation   and is admitted to the hospital for the management of Major depressive disorder, single episode, severe without psychosis ([facility] [address] 78-year-old gentleman who is overweight history of depression and suicidal ideation denies any medical history   No weight gain no weight loss no cold intolerance history of myopia wears glasses    Past Medical History:     History reviewed. No pertinent past medical history. Past Surgical History:     History reviewed. No pertinent surgical history. Medications Prior to Admission:     Prior to Admission medications    Not on File        Allergies:     Patient has no known allergies. Social History:     Tobacco:    reports that he has never smoked. He has never used smokeless tobacco.  Alcohol:      reports previous alcohol use. Drug Use:  reports no history of drug use. Family History:     History reviewed. No pertinent family history. Review of Systems:     Positive and Negative as described in HPI.     CONSTITUTIONAL:  negative for fevers, chills, sweats, fatigue, weight loss  HEENT:  negative for vision, hearing changes, tone and bulk, no abnormal sensation, normal speech, cranial nerves II through XII grossly intact  Skin: No gross lesions, rashes, bruising or bleeding on exposed skin area  Extremities: peripheral pulses palpable, no pedal edema or calf pain with palpation      Investigations:      Laboratory Testing:  Recent Results (from the past 24 hour(s))   TSH with Reflex    Collection Time: 06/16/22  6:36 AM   Result Value Ref Range    TSH 2.23 0.30 - 5.00 uIU/mL       Imaging/Diagnostics:  No results found. Assessment :      Hospital Problems           Last Modified POA    * (Principal) Major depressive disorder, single episode, severe without psychosis ([facility] [address] 6/15/2022 Yes          Plan:     52-year-old gentleman with a history of myopia who wears glasses admitted for depression we will check thyroid TSH with reflex  Vitals are stable labs reviewed we will follow    tsh is  2.23  Will sign off        Sam Knott MD  6/16/2022  3:27 PM    Copy sent to Dr. Fanny Le primary care provider on file. Please note that this chart was generated using voice recognition Dragon dictation software. Although every effort was made to ensure the accuracy of this automated transcription, some errors in transcription may have occurred.

## 2022-06-16 NOTE — GROUP NOTE
Group Therapy Note    Date: 6/16/2022    Group Start Time: 1100  Group End Time: 5237  Group Topic: Music Therapy    NORMA HERRERA    Claudette Alcon        Group Therapy Note    Attendees: 7/16       Patients engaged in education about the use of the DBT skill Positive Affirmations. Patient shared music that they feel reflects something positive about themselves, a goal they are working on, or a strength they have. Patients then framed these into a positive affirmation about themselves, and shared it with the group. Goals to engage in education; Increase self-esteem; Increase self-expression;      Notes:  Patient attended and participated in group, having positive interactions with peers and staff throughout. Aracelis shared a song and created an affitmation. Status After Intervention:  Improved    Participation Level:  Active Listener and Interactive    Participation Quality: Appropriate, Attentive and Sharing      Speech:  normal      Thought Process/Content: Logical  Linear      Affective Functioning: Congruent      Mood: euthymic      Level of consciousness:  Alert and Attentive      Response to Learning: Able to verbalize current knowledge/experience, Able to verbalize/acknowledge new learning and Progressing to goal      Endings: None Reported    Modes of Intervention: Education, Socialization, Exploration, Activity, Media and Reality-testing      Discipline Responsible: Psychoeducational Specialist      Signature:  Claudette Alcon

## 2022-06-17 VITALS
DIASTOLIC BLOOD PRESSURE: 64 MMHG | SYSTOLIC BLOOD PRESSURE: 114 MMHG | HEART RATE: 101 BPM | WEIGHT: 200 LBS | HEIGHT: 70 IN | RESPIRATION RATE: 14 BRPM | BODY MASS INDEX: 28.63 KG/M2 | OXYGEN SATURATION: 99 % | TEMPERATURE: 98.7 F

## 2022-06-17 PROCEDURE — 6370000000 HC RX 637 (ALT 250 FOR IP): Performed by: PSYCHIATRY & NEUROLOGY

## 2022-06-17 PROCEDURE — 99239 HOSP IP/OBS DSCHRG MGMT >30: CPT | Performed by: PSYCHIATRY & NEUROLOGY

## 2022-06-17 RX ORDER — SERTRALINE HYDROCHLORIDE 25 MG/1
25 TABLET, FILM COATED ORAL DAILY
Qty: 30 TABLET | Refills: 0 | Status: SHIPPED | OUTPATIENT
Start: 2022-06-17

## 2022-06-17 RX ADMIN — SERTRALINE HYDROCHLORIDE 25 MG: 25 TABLET ORAL at 08:20

## 2022-06-17 ASSESSMENT — LIFESTYLE VARIABLES
HOW MANY STANDARD DRINKS CONTAINING ALCOHOL DO YOU HAVE ON A TYPICAL DAY: 1 OR 2
HOW OFTEN DO YOU HAVE A DRINK CONTAINING ALCOHOL: NEVER

## 2022-06-17 NOTE — BH NOTE
[facility]  Discharge Note    Pt discharged with followings belongings:   Dental Appliances: None  Vision - Corrective Lenses: Eyeglasses  Hearing Aid: None  Jewelry: None  Body Piercings Removed: N/A  Clothing: Belt,Pants,Shirt,Socks,Undergarments,Footwear  Other Valuables: Money,Wallet ($0.12)   Valuables sent home with patient or returned to patient. Patient education on aftercare instructions: yes  Information faxed to [facility]  by RN  at 5:07 PM .Patient verbalize understanding of AVS:  yes. Status EXAM upon discharge:  Mental Status and Behavioral Exam  Normal: Yes  Level of Assistance: Independent/Self  Facial Expression: Brightened  Affect: Appropriate  Level of Consciousness: Alert  Frequency of Checks: 4 times per hour, close  Mood:Normal: Yes  Mood: Other (comment) (stable)  Motor Activity:Normal: No  Motor Activity: Increased  Eye Contact: Good  Observed Behavior: Cooperative  Sexual Misconduct History: Past - no  Involved In Any Sexual Misconduct With Others? : No  History of Sexually Inappropriate Behavior When Previously Hospitalized?: No  Uncontrollable/Compulsive Masturbation?: No  Difficulty Controlling Sexual Impulses?: No  Preception: [place] to person,[place] to time,[place] to place,[place] to situation  Attention:Normal: No  Attention: Distractible  Thought Processes: Circumstantial  Thought Content:Normal: No  Thought Content: Preoccupations  Depression Symptoms: No problems reported or observed. Anxiety Symptoms: No problems reported or observed. Sully Symptoms: No problems reported or observed.   Hallucinations: None  Delusions: No  Memory:Normal: Yes  Memory: Poor recent,Poor remote  Insight and Judgment: No  Insight and Judgment: Poor insight      Metabolic Screening:    No results found for: LABA1C    No results found for: CHOL  No results found for: TRIG  No results found for: HDL  No components found for: LDLCAL  No results found for: LABVLDL     Patient ambulated to discharge doors with all belongings accompanied by one staff. Patient left via courtesy cab to his private residence.      Dalia Goode RN

## 2022-06-17 NOTE — PLAN OF CARE
Problem: Depression/Self Harm  Goal: Effect of psychiatric condition will be minimized and patient will be protected from self harm  Description: INTERVENTIONS:  1. Assess impact of patient's symptoms on level of functioning, self care needs and offer support as indicated  2. Assess patient/family knowledge of depression, impact on illness and need for teaching  3. Provide emotional support, presence and reassurance  4. Assess for possible suicidal thoughts or ideation. If patient expresses suicidal thoughts or statements do not leave alone, initiate Suicide Precautions, move to a room close to the nursing station and obtain sitter  5. Initiate consults as appropriate with Mental Health Professional, Spiritual Care, Psychosocial CNS, and consider a recommendation to the LIP for a Psychiatric Consultation  6/16/2022 2117 by Isiah Ochoa  Outcome: Progressing   Patient denies suicidal ideations this evening. Patient also denies depression and anxiety during 1:1 talk time. Patient have been calm, cooperative and out talking on the phone and have been isolative to self. Q 15 minutes safety checks maintained.

## 2022-06-17 NOTE — PLAN OF CARE
Problem: Depression/Self Harm  Goal: Effect of psychiatric condition will be minimized and patient will be protected from self harm  Description: INTERVENTIONS:  1. Assess impact of patient's symptoms on level of functioning, self care needs and offer support as indicated  2. Assess patient/family knowledge of depression, impact on illness and need for teaching  3. Provide emotional support, presence and reassurance  4. Assess for possible suicidal thoughts or ideation. If patient expresses suicidal thoughts or statements do not leave alone, initiate Suicide Precautions, move to a room close to the nursing station and obtain sitter  5. Initiate consults as appropriate with Mental Health Professional, Spiritual Care, Psychosocial CNS, and consider a recommendation to the LIP for a Psychiatric Consultation  6/17/2022 1113 by Caro Gates RN  Outcome: Adequate for Discharge  Flowsheets (Taken 6/17/2022 1113)  Effect of psychiatric condition will be minimized and patient will be protected from self harm:   Assess impact of patients symptoms on level of functioning, self care needs and offer support as indicated   Assess patient/family knowledge of depression, impact on illness and need for teaching   Provide emotional support, presence and reassurance   Initiate consults as appropriate with Mental Health Professional, Spiritual Care, Psychosocial CNS, and consider a recommendation to the LIP for a Psychiatric Consultation   Assess for suicidal thoughts or ideation. If patient expresses suicidal thoughts or statements do not leave alone, initiate Suicide Precautions, move near nurse station, obtain sitter  Note: Patient denies suicidal ideations. No self harm behaviors observed. Patient voices controlled mood and readiness for discharge.    6/16/2022 2117 by Gayle Schwab  Outcome: Progressing     Problem: Depression/Self Harm  Goal: LTG-Able to verbalize and/or display a decrease in depressive symptoms  Outcome: Adequate for Discharge  Note: Patient affect is brightened. Denies depression. Denies suicidal ideations or self harm behaviors. Mood is stable . Voices readiness for discharge. Problem: Depression/Self Harm  Goal: STG-Able to verbalize suicidal ideations  Outcome: Adequate for Discharge  Note: Denies suicidal ideations. Problem: Pain  Goal: Verbalizes/displays adequate comfort level or baseline comfort level  Outcome: Adequate for Discharge  Note: Denies pain.

## 2022-06-17 NOTE — GROUP NOTE
Group Therapy Note    Date: 6/17/2022    Group Start Time: 9858  Group End Time: 1500  Group Topic: Music Therapy    NORMA HERRERA    Shiraz Reese        Group Therapy Note    Attendees: 8/14         Patient's Goal:  Patients shared preferred music and offered peers advice based on their edgar analysis of the music they shared. Patients goals to engage in peer support; Increase socialization; Increase sense of community; Increase self-expression    Notes:  Patient attended and participated in group having positive interactions with peers and staff throughout. Patient was pleasant and engaging. Shared song and advice with peers    Status After Intervention:  Improved    Participation Level:  Active Listener and Interactive    Participation Quality: Appropriate, Attentive, Sharing and Supportive      Speech:  normal      Thought Process/Content: Logical  Linear      Affective Functioning: Congruent      Mood: euthymic      Level of consciousness:  Alert and Attentive      Response to Learning: Able to verbalize current knowledge/experience, Capable of insight and Progressing to goal      Endings: None Reported    Modes of Intervention: Support, Socialization, Exploration, Activity, Media and Reality-testing      Discipline Responsible: Psychoeducational Specialist      Signature:  Shiraz Reese

## 2022-06-17 NOTE — PLAN OF CARE
Problem: Depression/Self Harm  Goal: Effect of psychiatric condition will be minimized and patient will be protected from self harm  Description: INTERVENTIONS:  1. Assess impact of patient's symptoms on level of functioning, self care needs and offer support as indicated  2. Assess patient/family knowledge of depression, impact on illness and need for teaching  3. Provide emotional support, presence and reassurance  4. Assess for possible suicidal thoughts or ideation. If patient expresses suicidal thoughts or statements do not leave alone, initiate Suicide Precautions, move to a room close to the nursing station and obtain sitter  5. Initiate consults as appropriate with Mental Health Professional, Spiritual Care, Psychosocial CNS, and consider a recommendation to the LIP for a Psychiatric Consultation  6/17/2022 1116 by Pippa Mi RN  Outcome: Completed  6/17/2022 1113 by Pippa Mi RN  Outcome: Adequate for Discharge  Flowsheets (Taken 6/17/2022 1113)  Effect of psychiatric condition will be minimized and patient will be protected from self harm:   Assess impact of patients symptoms on level of functioning, self care needs and offer support as indicated   Assess patient/family knowledge of depression, impact on illness and need for teaching   Provide emotional support, presence and reassurance   Initiate consults as appropriate with Mental Health Professional, Spiritual Care, Psychosocial CNS, and consider a recommendation to the LIP for a Psychiatric Consultation   Assess for suicidal thoughts or ideation. If patient expresses suicidal thoughts or statements do not leave alone, initiate Suicide Precautions, move near nurse station, obtain sitter  Note: Patient denies suicidal ideations. No self harm behaviors observed. Patient voices controlled mood and readiness for discharge.    6/16/2022 2117 by Nelson Soto  Outcome: Progressing     Problem: Depression/Self Harm  Goal: LTG-Able to verbalize and/or display a decrease in depressive symptoms  6/17/2022 1116 by Lucie Ross RN  Outcome: Completed  6/17/2022 1113 by Lucie Ross RN  Outcome: Adequate for Discharge  Note: Patient affect is brightened. Denies depression. Denies suicidal ideations or self harm behaviors. Mood is stable . Voices readiness for discharge. Problem: Depression/Self Harm  Goal: STG-Able to verbalize suicidal ideations  6/17/2022 1116 by Lucie Ross RN  Outcome: Completed  6/17/2022 1113 by Lucie Ross RN  Outcome: Adequate for Discharge  Note: Denies suicidal ideations. Problem: Pain  Goal: Verbalizes/displays adequate comfort level or baseline comfort level  6/17/2022 1116 by Lucie Ross RN  Outcome: Completed  6/17/2022 1113 by Lucie Ross RN  Outcome: Adequate for Discharge  Note: Denies pain.

## 2022-06-17 NOTE — GROUP NOTE
Group Therapy Note    Date: 6/17/2022    Group Start Time: 0900  Group End Time: 0930  Group Topic: Group Documentation    [facility]    Scotty Cline        Group Therapy Note    Attendees: 6/17         Patient's Goal:  D/C planning and speak with family about the events that brought him here  Notes:  Goal setting/support group    Status After Intervention:  Improved    Participation Level:  Active Listener and Interactive    Participation Quality: Appropriate, Attentive, Sharing and Supportive      Speech:  normal      Thought Process/Content: Logical      Affective Functioning: Congruent      Mood: anxious      Level of consciousness:  Alert, Oriented x4 and Attentive      Response to Learning: Able to verbalize current knowledge/experience, Able to verbalize/acknowledge new learning, Able to retain information and Capable of insight      Endings: None Reported    Modes of Intervention: Education, Support, Socialization, Exploration and Problem-solving      Discipline Responsible: [address], [employer]      Signature:  Scotty Cline

## 2022-06-17 NOTE — DISCHARGE INSTR - DIET
Good nutrition is important when healing from an illness, injury, or surgery. Follow any nutrition recommendations given to you during your hospital stay. If you were given an oral nutrition supplement while in the hospital, continue to take this supplement at home. You can take it with meals, in-between meals, and/or before bedtime. These supplements can be purchased at most local grocery stores, pharmacies, and chain [employer]-stores. If you have any questions about your diet or nutrition, call the hospital and ask for the dietitian.   Regular diet

## 2022-06-17 NOTE — GROUP NOTE
Group Therapy Note    Date: 6/17/2022    Group Start Time: 8992  Group End Time: 1130  Group Topic: Group Documentation    [facility]    Peter Faust        Group Therapy Note    Attendees: 6/14         Patient's Goal: Attend and participate in wellness group  Notes:  Identifying life enhancing skills to cope with life stressors    Status After Intervention:  Improved    Participation Level:  Active Listener and Interactive    Participation Quality: Appropriate      Speech:  normal      Thought Process/Content: Logical      Affective Functioning: Congruent      Mood: anxious      Level of consciousness:  Alert, Oriented x4 and Attentive      Response to Learning: Able to verbalize current knowledge/experience, Able to verbalize/acknowledge new learning, Able to retain information and Capable of insight      Endings: None Reported    Modes of Intervention: Education, Support, Socialization, Exploration and Problem-solving      Discipline Responsible: [facility] [address], [employer]      Signature:  Peter Faust

## 2022-06-18 NOTE — DISCHARGE SUMMARY
Provider Discharge Summary     Patient ID:  Kirt Mcintosh  [id number]  69 y.o.  2000    Admit date: 6/14/2022    Discharge date and time: 6/17/2022  10:12 PM     Admitting Physician: Tim Jonas MD     Discharge Physician: Tim Jonas MD    Admission Diagnoses: Depression with suicidal ideation [F32. A, R45.851]    Discharge Diagnoses:      Major depressive disorder, single episode, severe without psychosis ([facility] [address]     Patient Active Problem List   Diagnosis Code    Depression with suicidal ideation F32. A, R45.851    Major depressive disorder, single episode, severe without psychosis ([facility] [address] F32.2        Admission Condition: poor    Discharged Condition: stable    Indication for Admission: threat to self    History of Present Illnes (present tense wording is of findings from admission exam and are not necessarily indicative of current findings):   Kirt Mcintosh is a 25 y.o. male who has no significant past medical history. Patient presented to the [facility] ED accompanied by [facility] police after his mother called [phone]. According to ED documentation: Patient reports he got into a fight with his parents today at which time he disclosed suicidal ideations. Patient states \" I told my mom that I want to kill myself all the time. \" Patient states this is a true statement. Patient reports his mom got worried and called the police for help.  Raghu Torres is interviewed today in private, he endorses depression and reports that he has had difficulty controlling his emotions the past few years. He reports that he has had increased stress and lost a lot of friends during the pandemic. He reports that this past year he has been more aware of his inability to control his anger and frustration. He reports that he has lost 2 jobs related to emotional instability and lashing out at his employers.   He reports that he has never become physical however states \"it is obvious when I get angry and I cannot control it\".  Tres Scanlon endorses depression, he reports that it interferes with his interest in enjoyable activities, he endorses feelings of guilt and worthlessness, he reports poor focus and concentration and has periods of time where he feels hopeless that his life will get better. He reports that on occasion he feels psychomotor slowing. He acknowledges that he did threaten to shoot himself in front of his parents when he was arguing with them about his recent visit with a primary care physician. He states that making the statement that he would shoot himself seemed \"the easiest and quickest\" thing to do. He denies any current or historical symptoms of ousmane, psychosis, OCD or PTSD. He reports having some anxiety however feels that when he gets worked up he works himself into a panic. He endorses symptoms of hyperventilating, crying and having difficulty \"seeing the future\". He reports that this is what occurred yesterday when he was fighting with his parents. He reports that their argument was heated and that he made \"irrational statements\". He reports that overall he does not actually want to die however gets overwhelmed and feels that it is the easiest way to overcome his problems. He is able to identify protective factors such as his family, friends and schooling that make him feel there is some hope that things could get better. In addition to symptoms of anxiety and panic we also explored personality traits. He does endorse fear of abandonment and rejection, a history of unstable relationships and intense anger outbursts however denies any history of self damaging behavior or chronic emptiness.     Tres Scanlon denies any history of tobacco, alcohol or illicit drug use.   He reports that he scheduled an appointment with his primary care physician to discuss the issues that he has had related to his mood and he states \"the doctor did not listen to anything that I had to say, I do not even think he read the questionnaire I filled out\". He reports that he has never been on any psychotropic medication. This is his first hospitalization and he denies any previous actual suicide attempts. He is willing to trial antidepressant medication and reports that he does believe he would benefit from therapy to work on coping skills. His plan is to return home with his parents however he states he may take some time and move in with a friend in [address] to get away for a little while. He just finished his first semester in school at [employer] and reports that that is going well and he \"does not want to mess it up\". We did discuss the importance of stability in symptoms and he remains agreeable to contract for safety on the inpatient unit. He verbalizes understanding that with the medication we will make sure that he is tolerating it well before discharging home and following up outpatient. Hospital Course:   Upon admission, Yazan Jimenez was provided a safe secure environment, introduced to unit milieu. Patient participated in groups and individual therapies. Meds were adjusted as noted below. After few days of hospital care, patient began to feel improvement. Depression lifted, thoughts to harm self ceased. Sleep improved, appetite was good. On morning rounds 6/17/2022, Yazan Jimenez endorses feeling ready for discharge. Patient denies suicidal or homicidal ideations, denies hallucinations or delusions. Denies SE's from meds. It was decided that maximum benefit from hospital care had been achieved and patient can be discharged. Consults:   none    Significant Diagnostic Studies: Routine labs and diagnostics    Treatments: Psychotropic medications, therapy with group, milieu, and 1:1 with nurses, social workers, PARENATO/CNP, and Attending physician.       Discharge Medications:  Discharge Medication List as of 6/17/2022  2:56 PM      START taking these medications    Details   sertraline (ZOLOFT) 25 MG tablet Take 1 tablet by mouth daily, Disp-30 tablet, R-0Normal              Core Measures statement:   Not applicable    Discharge Exam:  Level of consciousness:  Within normal limits  Appearance: Street clothes, seated, with good grooming  Behavior/Motor: No abnormalities noted  Attitude toward examiner:  Cooperative, attentive, good eye contact  Speech:  spontaneous, normal rate, normal volume and well articulated  Mood:  euthymic  Affect:  Full range  Thought processes:  linear, goal directed and coherent  Thought content:  denies homicidal ideation  Suicidal Ideation:  denies suicidal ideation  Delusions:  no evidence of delusions  Perceptual Disturbance:  denies any perceptual disturbance  Cognition:  Intact  Memory: age appropriate  Insight & Judgement: fair  Medication side effects: denies     Disposition: home    Patient Instructions: Activity: activity as tolerated  1. Patient instructed to take medications regularly and follow up with outpatient appointments. Follow-up as scheduled with [facility]       Signed:    Electronically signed by Jessie Knox MD on 6/17/22 at 10:12 PM EDT    Time Spent on discharge is more than 33 minutes in the examination, evaluation, counseling and review of medications and discharge plan.

## 2022-07-21 RX ORDER — SERTRALINE HYDROCHLORIDE 25 MG/1
TABLET, FILM COATED ORAL
Qty: 30 TABLET | Refills: 0 | OUTPATIENT
Start: 2022-07-21

## 2022-08-01 RX ORDER — SERTRALINE HYDROCHLORIDE 25 MG/1
TABLET, FILM COATED ORAL
Qty: 30 TABLET | Refills: 0 | OUTPATIENT
Start: 2022-08-01